# Patient Record
Sex: FEMALE | Race: WHITE
[De-identification: names, ages, dates, MRNs, and addresses within clinical notes are randomized per-mention and may not be internally consistent; named-entity substitution may affect disease eponyms.]

---

## 2018-10-15 ENCOUNTER — HOSPITAL ENCOUNTER (EMERGENCY)
Dept: HOSPITAL 92 - ERS | Age: 48
Discharge: HOME | End: 2018-10-15
Payer: SELF-PAY

## 2018-10-15 DIAGNOSIS — F32.9: ICD-10-CM

## 2018-10-15 DIAGNOSIS — Z79.899: ICD-10-CM

## 2018-10-15 DIAGNOSIS — Z87.891: ICD-10-CM

## 2018-10-15 DIAGNOSIS — S93.402A: Primary | ICD-10-CM

## 2018-10-15 DIAGNOSIS — E78.5: ICD-10-CM

## 2018-10-15 DIAGNOSIS — E03.9: ICD-10-CM

## 2018-10-15 NOTE — RAD
LEFT ANKLE 3 VIEWS:

 

HISTORY: 

Ankle pain after falling in a parking lot at [employer].

 

FINDINGS: 

Tiny bony density adjacent to the tip of the medial malleolus may represent an accessory ossicle or p
ossibly be related to an old injury.  It is corticated and does not appear acute.  I do not see any d
efinite joint effusion.  Calcaneal spurs are noted.  There are arthritic changes of the tarsal bone r
egion.

 

IMPRESSION: 

No acute injury.

 

POS: MAGDALENE

## 2018-10-15 NOTE — RAD
LEFT FOOT THREE VIEWS:

 

History: Trauma, fall. 

 

Comparison: None. 

 

FINDINGS: 

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
hanges of the proximal phalanx base of the 2nd toe. Mildly displaced plantar and dorsal calcaneal spu
rs. 

 

The lisfranc interval appears to be maintained. No acute displaced fracture or malalignment is apprec
iated. 

 

IMPRESSION: 

1. No acute displaced fracture or malalignment. 

2. Moderate midfoot degenerative changes. 

3. 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
imal phalanx base. 

 

POS: MAGDALENE